# Patient Record
Sex: MALE | Race: OTHER | Employment: FULL TIME | URBAN - METROPOLITAN AREA
[De-identification: names, ages, dates, MRNs, and addresses within clinical notes are randomized per-mention and may not be internally consistent; named-entity substitution may affect disease eponyms.]

---

## 2017-04-13 ENCOUNTER — HOSPITAL ENCOUNTER (EMERGENCY)
Age: 32
Discharge: HOME | End: 2017-04-13
Attending: EMERGENCY MEDICINE
Payer: COMMERCIAL

## 2017-04-13 VITALS
OXYGEN SATURATION: 100 % | WEIGHT: 165 LBS | SYSTOLIC BLOOD PRESSURE: 112 MMHG | BODY MASS INDEX: 21.87 KG/M2 | HEIGHT: 73 IN | RESPIRATION RATE: 18 BRPM | TEMPERATURE: 99 F | DIASTOLIC BLOOD PRESSURE: 48 MMHG | HEART RATE: 92 BPM

## 2017-04-13 DIAGNOSIS — K50.90 EXACERBATION OF CROHN'S DISEASE, WITHOUT COMPLICATIONS (HCC): Primary | ICD-10-CM

## 2017-04-13 LAB
ALBUMIN SERPL BCP-MCNC: 4.3 G/DL (ref 3.4–5)
ALBUMIN/GLOB SERPL: 1.5 {RATIO} (ref 0.8–1.7)
ALP SERPL-CCNC: 86 U/L (ref 45–117)
ALT SERPL-CCNC: 23 U/L (ref 16–61)
ANION GAP BLD CALC-SCNC: 11 MMOL/L (ref 3–18)
AST SERPL W P-5'-P-CCNC: 21 U/L (ref 15–37)
BASOPHILS # BLD AUTO: 0 K/UL (ref 0–0.06)
BASOPHILS # BLD: 1 % (ref 0–2)
BILIRUB SERPL-MCNC: 0.9 MG/DL (ref 0.2–1)
BUN SERPL-MCNC: 9 MG/DL (ref 7–18)
BUN/CREAT SERPL: 9 (ref 12–20)
CALCIUM SERPL-MCNC: 9.3 MG/DL (ref 8.5–10.1)
CHLORIDE SERPL-SCNC: 107 MMOL/L (ref 100–108)
CO2 SERPL-SCNC: 24 MMOL/L (ref 21–32)
CREAT SERPL-MCNC: 1.02 MG/DL (ref 0.6–1.3)
DIFFERENTIAL METHOD BLD: ABNORMAL
EOSINOPHIL # BLD: 0 K/UL (ref 0–0.4)
EOSINOPHIL NFR BLD: 1 % (ref 0–5)
ERYTHROCYTE [DISTWIDTH] IN BLOOD BY AUTOMATED COUNT: 14.8 % (ref 11.6–14.5)
GLOBULIN SER CALC-MCNC: 2.8 G/DL (ref 2–4)
GLUCOSE SERPL-MCNC: 70 MG/DL (ref 74–99)
HCT VFR BLD AUTO: 42.8 % (ref 36–48)
HGB BLD-MCNC: 13.9 G/DL (ref 13–16)
LYMPHOCYTES # BLD AUTO: 19 % (ref 21–52)
LYMPHOCYTES # BLD: 1.6 K/UL (ref 0.9–3.6)
MCH RBC QN AUTO: 28.8 PG (ref 24–34)
MCHC RBC AUTO-ENTMCNC: 32.5 G/DL (ref 31–37)
MCV RBC AUTO: 88.8 FL (ref 74–97)
MONOCYTES # BLD: 0.9 K/UL (ref 0.05–1.2)
MONOCYTES NFR BLD AUTO: 11 % (ref 3–10)
NEUTS SEG # BLD: 5.8 K/UL (ref 1.8–8)
NEUTS SEG NFR BLD AUTO: 68 % (ref 40–73)
PLATELET # BLD AUTO: 208 K/UL (ref 135–420)
PMV BLD AUTO: 10.6 FL (ref 9.2–11.8)
POTASSIUM SERPL-SCNC: 3.8 MMOL/L (ref 3.5–5.5)
PROT SERPL-MCNC: 7.1 G/DL (ref 6.4–8.2)
RBC # BLD AUTO: 4.82 M/UL (ref 4.7–5.5)
SODIUM SERPL-SCNC: 142 MMOL/L (ref 136–145)
WBC # BLD AUTO: 8.3 K/UL (ref 4.6–13.2)

## 2017-04-13 PROCEDURE — 99283 EMERGENCY DEPT VISIT LOW MDM: CPT

## 2017-04-13 PROCEDURE — 85025 COMPLETE CBC W/AUTO DIFF WBC: CPT | Performed by: PHYSICIAN ASSISTANT

## 2017-04-13 PROCEDURE — 74011250636 HC RX REV CODE- 250/636: Performed by: PHYSICIAN ASSISTANT

## 2017-04-13 PROCEDURE — 96375 TX/PRO/DX INJ NEW DRUG ADDON: CPT

## 2017-04-13 PROCEDURE — 80053 COMPREHEN METABOLIC PANEL: CPT | Performed by: PHYSICIAN ASSISTANT

## 2017-04-13 PROCEDURE — 96361 HYDRATE IV INFUSION ADD-ON: CPT

## 2017-04-13 PROCEDURE — 96374 THER/PROPH/DIAG INJ IV PUSH: CPT

## 2017-04-13 RX ORDER — ALPRAZOLAM 0.25 MG/1
TABLET ORAL
COMMUNITY
End: 2020-01-28

## 2017-04-13 RX ORDER — KETOROLAC TROMETHAMINE 30 MG/ML
30 INJECTION, SOLUTION INTRAMUSCULAR; INTRAVENOUS
Status: COMPLETED | OUTPATIENT
Start: 2017-04-13 | End: 2017-04-13

## 2017-04-13 RX ORDER — HYDROCODONE BITARTRATE AND ACETAMINOPHEN 5; 325 MG/1; MG/1
TABLET ORAL
Qty: 12 TAB | Refills: 0 | Status: SHIPPED | OUTPATIENT
Start: 2017-04-13 | End: 2020-01-28

## 2017-04-13 RX ORDER — CLONAZEPAM 0.5 MG/1
TABLET ORAL
COMMUNITY
End: 2020-01-28

## 2017-04-13 RX ORDER — LITHIUM CARBONATE 150 MG/1
CAPSULE ORAL 3 TIMES DAILY
COMMUNITY
End: 2020-01-28

## 2017-04-13 RX ORDER — ONDANSETRON 2 MG/ML
4 INJECTION INTRAMUSCULAR; INTRAVENOUS
Status: COMPLETED | OUTPATIENT
Start: 2017-04-13 | End: 2017-04-13

## 2017-04-13 RX ORDER — ONDANSETRON 4 MG/1
TABLET, ORALLY DISINTEGRATING ORAL
Qty: 10 TAB | Refills: 0 | Status: SHIPPED | OUTPATIENT
Start: 2017-04-13 | End: 2020-01-28

## 2017-04-13 RX ORDER — PREDNISONE 20 MG/1
40 TABLET ORAL DAILY
Qty: 24 TAB | Refills: 0 | Status: SHIPPED | OUTPATIENT
Start: 2017-04-13 | End: 2017-04-25

## 2017-04-13 RX ORDER — LAMOTRIGINE 100 MG/1
TABLET ORAL DAILY
COMMUNITY
End: 2020-01-28

## 2017-04-13 RX ORDER — EMTRICITABINE AND TENOFOVIR DISOPROXIL FUMARATE 200; 300 MG/1; MG/1
TABLET, FILM COATED ORAL DAILY
COMMUNITY
End: 2020-01-28

## 2017-04-13 RX ORDER — BUDESONIDE 3 MG/1
9 CAPSULE, COATED PELLETS ORAL
Qty: 90 CAP | Refills: 0 | Status: SHIPPED | OUTPATIENT
Start: 2017-04-13 | End: 2017-05-13

## 2017-04-13 RX ADMIN — SODIUM CHLORIDE 1000 ML: 900 INJECTION, SOLUTION INTRAVENOUS at 20:05

## 2017-04-13 RX ADMIN — METHYLPREDNISOLONE SODIUM SUCCINATE 125 MG: 125 INJECTION, POWDER, FOR SOLUTION INTRAMUSCULAR; INTRAVENOUS at 20:05

## 2017-04-13 RX ADMIN — ONDANSETRON 4 MG: 2 INJECTION INTRAMUSCULAR; INTRAVENOUS at 20:07

## 2017-04-13 RX ADMIN — KETOROLAC TROMETHAMINE 30 MG: 30 INJECTION, SOLUTION INTRAMUSCULAR at 20:07

## 2017-04-13 NOTE — LETTER
[facility] EMERGENCY DEPT 
[address] [place] [address] [id number] [phone] Work/School Note Date: 4/13/2017 To Whom It May concern: 
 
Alexys Ramirez was seen and treated today in the emergency room by the following provider(s): 
Attending Provider: Hannah Cohen MD 
Physician Assistant: Zack Morales PA-C. Alexys Ramirez may return to work on 4/14/17. Sincerely, Zack Morales PA-C

## 2017-04-14 NOTE — ED PROVIDER NOTES
HPI Comments: 35 yo M with h/o crohn's dz c/o abdominal pain, nausea and vomiting which started yesterday. Pain typical of crohn's flare. Has not tried anything at home for sx. Is followed by Dr. Brennan Aguilar for GI. Denies fever, CP, SOB, diarrhea. No other complaints. Patient is a 36 y.o. male presenting with abdominal pain and vomiting. Abdominal Pain    Associated symptoms include nausea and vomiting. Vomiting    Associated symptoms include abdominal pain. Past Medical History:   Diagnosis Date    Crohn disease ([facility] [address]     Psychiatric disorder     anxiety, depression, bipolar       Past Surgical History:   Procedure Laterality Date    HX HEENT      t and A         History reviewed. No pertinent family history. Social History     Social History    Marital status: SINGLE     Spouse name: N/A    Number of children: N/A    Years of education: N/A     Occupational History    Not on file. Social History Main Topics    Smoking status: Current Some Day Smoker    Smokeless tobacco: Not on file    Alcohol use Not on file    Drug use: Not on file    Sexual activity: Not on file     Other Topics Concern    Not on file     Social History Narrative    No narrative on file         ALLERGIES: Review of patient's allergies indicates no known allergies. Review of Systems   Gastrointestinal: Positive for abdominal pain, nausea and vomiting. All other systems reviewed and are negative. Vitals:    04/13/17 2000 04/13/17 2007   BP: 113/67 113/67   Pulse:  67   Resp:  18   Temp:  99 °F (37.2 °C)   SpO2: 100% 100%   Weight:  74.8 kg (165 lb)   Height:  6' 1\" (1.854 m)            Physical Exam   Constitutional: He is oriented to person, place, and time. He appears well-developed and well-nourished. No distress. HENT:   Head: Normocephalic and atraumatic. Eyes: Conjunctivae are normal.   Neck: Normal range of motion. Neck supple.    Cardiovascular: Normal rate, regular rhythm and normal heart sounds. Pulmonary/Chest: Effort normal and breath sounds normal. No respiratory distress. He has no wheezes. He has no rales. Abdominal: Soft. Normal appearance. There is tenderness in the left upper quadrant. Musculoskeletal: Normal range of motion. Neurological: He is alert and oriented to person, place, and time. Skin: Skin is warm and dry. Psychiatric: He has a normal mood and affect. His behavior is normal. Judgment and thought content normal.   Nursing note and vitals reviewed. MDM  Number of Diagnoses or Management Options  Exacerbation of Crohn's disease, without complications [facility]):     ED Course       Procedures    -------------------------------------------------------------------------------------------------------------------     EKG INTERPRETATIONS:      RADIOLOGY RESULTS:   No orders to display       LABORATORY RESULTS:  Recent Results (from the past 12 hour(s))   CBC WITH AUTOMATED DIFF    Collection Time: 04/13/17  7:30 PM   Result Value Ref Range    WBC 8.3 4.6 - 13.2 K/uL    RBC 4.82 4.70 - 5.50 M/uL    HGB 13.9 13.0 - 16.0 g/dL    HCT 42.8 36.0 - 48.0 %    MCV 88.8 74.0 - 97.0 FL    MCH 28.8 24.0 - 34.0 PG    MCHC 32.5 31.0 - 37.0 g/dL    RDW 14.8 (H) 11.6 - 14.5 %    PLATELET 118 881 - 228 K/uL    MPV 10.6 9.2 - 11.8 FL    NEUTROPHILS 68 40 - 73 %    LYMPHOCYTES 19 (L) 21 - 52 %    MONOCYTES 11 (H) 3 - 10 %    EOSINOPHILS 1 0 - 5 %    BASOPHILS 1 0 - 2 %    ABS. NEUTROPHILS 5.8 1.8 - 8.0 K/UL    ABS. LYMPHOCYTES 1.6 0.9 - 3.6 K/UL    ABS. MONOCYTES 0.9 0.05 - 1.2 K/UL    ABS. EOSINOPHILS 0.0 0.0 - 0.4 K/UL    ABS.  BASOPHILS 0.0 0.0 - 0.06 K/UL    DF AUTOMATED     METABOLIC PANEL, COMPREHENSIVE    Collection Time: 04/13/17  7:30 PM   Result Value Ref Range    Sodium 142 136 - 145 mmol/L    Potassium 3.8 3.5 - 5.5 mmol/L    Chloride 107 100 - 108 mmol/L    CO2 24 21 - 32 mmol/L    Anion gap 11 3.0 - 18 mmol/L    Glucose 70 (L) 74 - 99 mg/dL    BUN 9 7.0 - 18 MG/DL    Creatinine 1. 02 0.6 - 1.3 MG/DL    BUN/Creatinine ratio 9 (L) 12 - 20      GFR est AA >60 >60 ml/min/1.73m2    GFR est non-AA >60 >60 ml/min/1.73m2    Calcium 9.3 8.5 - 10.1 MG/DL    Bilirubin, total 0.9 0.2 - 1.0 MG/DL    ALT (SGPT) 23 16 - 61 U/L    AST (SGOT) 21 15 - 37 U/L    Alk. phosphatase 86 45 - 117 U/L    Protein, total 7.1 6.4 - 8.2 g/dL    Albumin 4.3 3.4 - 5.0 g/dL    Globulin 2.8 2.0 - 4.0 g/dL    A-G Ratio 1.5 0.8 - 1.7             CONSULTATIONS:        PROGRESS NOTES:    8:55 PM Pt well appearing and in NAD, feeling better with meds and IVF here. Pain typical for crohn's flare. Labs unremarkable. Do not feel he needs imaging at this time. D/h with steroids per previous recommendation by GI at previous ED visit last year at [facility]. Return precautions given. Lengthy D/W pt regarding possible worsening of pt's condition, need for follow up and strict ED return instructions for any worsening symptoms. DISPOSITION:  ED DIAGNOSIS & DISPOSITION INFORMATION  Diagnosis:   1. Exacerbation of Crohn's disease, without complications ([address]          Disposition: home    Follow-up Information     Follow up With Details Comments Contact Info    Ganga Brown MD Call on 4/14/2017 To make a follow up appointment [address] [place] [address] [phone] [place] [address] EMERGENCY DEPT  Immediately if symptoms worsen [id number]  [phone]          Patient's Medications   Start Taking    BUDESONIDE (ENTOCORT EC) 3 MG CAPSULE    Take 3 Caps by mouth every morning for 30 days. HYDROCODONE-ACETAMINOPHEN (NORCO) 5-325 MG PER TABLET    Take 1-2 tablets PO every 4-6 hours as needed for pain control. If over the counter ibuprofen or acetaminophen was suggested, then only take the vicodin for pain not well controlled with the over the counter medication. ONDANSETRON (ZOFRAN ODT) 4 MG DISINTEGRATING TABLET    Take 1-2 tablets every 6-8 hours as needed for nausea and vomiting.     PREDNISONE (DELTASONE) 20 MG TABLET    Take 2 Tabs by mouth daily for 12 days. With Breakfast   Continue Taking    ALPRAZOLAM (XANAX) 0.25 MG TABLET    Take  by mouth. CLONAZEPAM (KLONOPIN) 0.5 MG TABLET    Take  by mouth nightly as needed. EMTRICITABINE-TENOFOVIR, TDF, (TRUVADA) 200-300 MG PER TABLET    Take  by mouth daily. LAMOTRIGINE (LAMICTAL) 100 MG TABLET    Take  by mouth daily. LITHIUM CARBONATE 150 MG CAPSULE    Take  by mouth three (3) times daily.    These Medications have changed    No medications on file   Stop Taking    No medications on file

## 2017-04-14 NOTE — DISCHARGE INSTRUCTIONS
Crohn's Disease: Care Instructions  Your Care Instructions    Crohn's disease is a lifelong inflammatory bowel disease (IBD). Parts of the digestive tract get swollen and irritated and may develop deep sores called ulcers. Crohn's disease usually occurs in the last part of the small intestine and the first part of the large intestine. But it can develop anywhere from the mouth to the anus. The main symptoms of Crohn's disease are belly pain, diarrhea, fever, and weight loss. Some people may have constipation. Crohn's disease also sometimes causes problems with the joints, eyes, or skin. Your symptoms may be mild at some times and severe at others. The disease can also go into remission, which means that it is not active and you have no symptoms. Bad attacks of Crohn's disease often have to be treated in the hospital so that you can get medicines and liquids through a tube in your vein, called an IV. This gives your digestive system time to rest and recover. Talk with your doctor about the best treatments for you. You may need medicines that help prevent or treat flare-ups of the disease. You may need surgery to remove part of your bowel if you have an abnormal opening in the bowel (fistula), an abscess, or a bowel obstruction. In some cases, surgery is needed if medicines do not work. But symptoms often return to other areas of the intestines after surgery. Learning good self-care can help you reduce your symptoms and manage Crohn's disease. Follow-up care is a key part of your treatment and safety. Be sure to make and go to all appointments, and call your doctor if you are having problems. Its also a good idea to know your test results and keep a list of the medicines you take. How can you care for yourself at home? · Take your medicines exactly as prescribed. Call your doctor if you think you are having a problem with your medicine.  You will get more details on the specific medicines your doctor prescribes. · Do not take anti-inflammatory medicines, such as aspirin, ibuprofen (Advil, Motrin), or naproxen (Aleve). They may make your symptoms worse. Do not take any other medicines or herbal products without talking to your doctor first.  · Avoid foods that make your symptoms worse. These might include milk, alcohol, high-fiber foods, or spicy foods. · Eat a healthy diet. Make sure to get enough iron. Rectal bleeding may make you lose iron. Good sources of iron include beef, lentils, spinach, raisins, and iron-enriched breads and cereals. · Drink liquid meal replacements if your doctor recommends them. These are high in calories and contain vitamins and minerals. Severe symptoms may make it hard for your body to absorb vitamins and minerals. · Do not smoke. Smoking makes Crohn's disease worse. If you need help quitting, talk to your doctor about stop-smoking programs and medicines. These can increase your chances of quitting for good. · Seek support from friends and family to help cope with Crohn's disease. The illness can affect all parts of your life. Get counseling if you need it. When should you call for help? Call 911 anytime you think you may need emergency care. For example, call if:  · You have severe belly pain. · You passed out (lost consciousness). Call your doctor now or seek immediate medical care if:  · You have signs of needing more fluids. You have sunken eyes and a dry mouth, and you pass only a little dark urine. · You have pain and swelling in the anal area, or you have pus draining from the anal area. · You have a fever or shaking chills. · Your belly is bloated. Watch closely for changes in your health, and be sure to contact your doctor if:  · Your symptoms get worse. · You have diarrhea for more than 2 weeks. · Your pain is not steadily getting better. · You have unexplained weight loss. Where can you learn more?   Go to http://nilson.info/. Enter 21  in the search box to learn more about \"Crohn's Disease: Care Instructions. \"  Current as of: August 9, 2016  Content Version: 11.2  © 4672-1120 [employer]. Care instructions adapted under license by [employer] (which disclaims liability or warranty for this information). If you have questions about a medical condition or this instruction, always ask your healthcare professional. [place] [address] any warranty or liability for your use of this information.

## 2018-02-23 ENCOUNTER — HOSPITAL ENCOUNTER (OUTPATIENT)
Dept: LAB | Age: 33
Discharge: HOME | End: 2018-02-23

## 2018-02-23 LAB — SENTARA SPECIMEN COL,SENBCF: NORMAL

## 2018-02-23 PROCEDURE — 99001 SPECIMEN HANDLING PT-LAB: CPT | Performed by: INTERNAL MEDICINE

## 2019-02-01 ENCOUNTER — HOSPITAL ENCOUNTER (OUTPATIENT)
Dept: LAB | Age: 34
Discharge: HOME | End: 2019-02-01

## 2019-02-01 LAB — SENTARA SPECIMEN COL,SENBCF: NORMAL

## 2019-02-01 PROCEDURE — 99001 SPECIMEN HANDLING PT-LAB: CPT

## 2019-05-14 ENCOUNTER — HOSPITAL ENCOUNTER (OUTPATIENT)
Dept: LAB | Age: 34
Discharge: HOME | End: 2019-05-14

## 2019-05-14 LAB — SENTARA SPECIMEN COL,SENBCF: NORMAL

## 2019-05-14 PROCEDURE — 99001 SPECIMEN HANDLING PT-LAB: CPT

## 2019-05-15 ENCOUNTER — HOSPITAL ENCOUNTER (OUTPATIENT)
Dept: LAB | Age: 34
Discharge: HOME | End: 2019-05-15

## 2019-05-15 LAB — SENTARA SPECIMEN COL,SENBCF: NORMAL

## 2019-05-15 PROCEDURE — 99001 SPECIMEN HANDLING PT-LAB: CPT

## 2020-01-28 PROBLEM — A41.9 SEPSIS (HCC): Status: ACTIVE | Noted: 2018-03-22
